# Patient Record
Sex: FEMALE | Race: WHITE | NOT HISPANIC OR LATINO | Employment: FULL TIME | URBAN - METROPOLITAN AREA
[De-identification: names, ages, dates, MRNs, and addresses within clinical notes are randomized per-mention and may not be internally consistent; named-entity substitution may affect disease eponyms.]

---

## 2017-11-25 ENCOUNTER — APPOINTMENT (OUTPATIENT)
Dept: LAB | Facility: HOSPITAL | Age: 22
End: 2017-11-25
Payer: COMMERCIAL

## 2017-11-25 ENCOUNTER — OFFICE VISIT (OUTPATIENT)
Dept: URGENT CARE | Facility: CLINIC | Age: 22
End: 2017-11-25
Payer: COMMERCIAL

## 2017-11-25 DIAGNOSIS — R30.0 DYSURIA: ICD-10-CM

## 2017-11-25 PROCEDURE — G0382 LEV 3 HOSP TYPE B ED VISIT: HCPCS

## 2017-11-27 NOTE — PROGRESS NOTES
Assessment    1  UTI (urinary tract infection) (599 0) (N39 0)    Plan  Burning with urination    · (1) URINE CULTURE; Source:Urine, Clean Catch; Status:Active - Retrospective ByProtocol Authorization; Requested for:25Nov2017;   UTI (urinary tract infection)    · Nitrofurantoin Monohyd Macro 100 MG Oral Capsule; TAKE 1 CAPSULE EVERY 12HOURS DAILY   · Pyridium 200 MG Oral Tablet; TAKE 1 TABLET 3 TIMES DAILY AFTER MEALS    Discussion/Summary  Discussion Summary:   Patient's symptoms are likely consistent with UTI  An antibiotic have been prescribed, please take as directed  Plenty of fluids advised  Recommend daily probiotic or eating yogurt with live cultures while on antibiotic  Urine culture will be sent for definitive evaluation  Results take approx 72 hours to return  May try over-the-counter Pyridium for next 24-48 hours for any excessive burning, pain, or pressure in the bladder area  Be advised, it may turn urine an orange color  Watch for any worsening of the symptoms or any fever or chills, flank pain, nausea or worsening abdominal pain and if any, advised to seek immediate medical attention  If symptoms persist, follow-up with PCP for re-evaluation in 5-7 days  Medication Side Effects Reviewed: Possible side effects of new medications were reviewed with the patient/guardian today  Understands and agrees with treatment plan: The treatment plan was reviewed with the patient/guardian  The patient/guardian understands and agrees with the treatment plan   Counseling Documentation With Imm: The patient was counseled regarding instructions for management  Follow Up Instructions: Follow Up with your Primary Care Provider in 5-7 days  If your symptoms worsen, go to the nearest [facility] Emergency Department  Chief Complaint    1   Abdominal Pain  Chief Complaint Free Text Note Form: Suprapubic pain wit low grade fever and urinary burning started this am      History of Present Illness  HPI: 22year-old female with a complaint of increased urinary frequency/urgency, dysuria since today  She states that there is 0 chance she is pregnant  Patient states she has a history of urinary tract infections  she has also been having some suprapubic tenderness starting this morning  Patient denies any back pain, fevers, chills, shortness of breath, chest pain, difficulty breathing, nausea/vomiting, Diarrhea  Hospital Based Practices Required Assessment:  Pain Assessment  the patient states they have pain  The pain is located in the suprapubic  The patient describes the pain as dull  Abuse And Domestic Violence Screen   Yes, the patient is safe at home  -- The patient states no one is hurting them  Depression And Suicide Screen  Yes, the patient has had thoughts of hurting themself  No, the patient has not felt depressed in the past 7 days  Review of Systems  Focused-Female:  Constitutional: as noted in HPI  Cardiovascular: as noted in HPI  Respiratory: as noted in HPI  Genitourinary: as noted in HPI  ROS Reviewed:   ROS reviewed  Active Problems  1  Burning with urination (788 1) (R30 0)    Past Medical History  1  History of urinary tract infection (V13 02) (Z87 440)  Active Problems And Past Medical History Reviewed: The active problems and past medical history were reviewed and updated today  Family History  Family History Reviewed: The family history was reviewed and updated today  Social History   · Never a smoker  Social History Reviewed: The social history was reviewed and updated today  The social history was reviewed and is unchanged  Surgical History  Surgical History Reviewed: The surgical history was reviewed and updated today  Current Meds   1  No Reported Medications Recorded  Medication List Reviewed: The medication list was reviewed and updated today  Allergies    1   Tussin SYRP    Vitals  Signs   Recorded: [id number] 01:23PM   Temperature: 100 F  Heart Rate: 82  Respiration: 20  Systolic: 130  Diastolic: 70  Height: 5 ft   Weight: 154 lb   BMI Calculated: 30 08  BSA Calculated: 1 67  O2 Saturation: 99  Pain Scale: 4    Physical Exam   Constitutional  General appearance: No acute distress, well appearing and well nourished  Pulmonary  Respiratory effort: No increased work of breathing or signs of respiratory distress  Auscultation of lungs: Clear to auscultation  Cardiovascular  Palpation of heart: Normal PMI, no thrills  Auscultation of heart: Normal rate and rhythm, normal S1 and S2, without murmurs  Abdomen  Abdomen: Abnormal  -- Suprapubic tenderness to palpation  No tenderness to palpation in LLQ, RLQ, RUQ, LUQ  Normoactive bowel sounds  Psychiatric  Orientation to person, place, and time: Normal    Mood and affect: Normal        Results/Data  Lab Studies Reviewed: Patient with positive trace amounts of blood in urine and small amount of leukocytes  No nitrites        Signatures   Electronically signed by : Brenda Cruz, [facility]; Nov 25 2017  2:24PM EST                       (Author)    Electronically signed by : Kingston Claude, M D ; Nov 26 2017  8:32AM EST